# Patient Record
Sex: MALE | Race: WHITE
[De-identification: names, ages, dates, MRNs, and addresses within clinical notes are randomized per-mention and may not be internally consistent; named-entity substitution may affect disease eponyms.]

---

## 2020-02-22 ENCOUNTER — HOSPITAL ENCOUNTER (EMERGENCY)
Dept: HOSPITAL 77 - KA.ED | Age: 60
Discharge: HOME | End: 2020-02-22
Payer: COMMERCIAL

## 2020-02-22 DIAGNOSIS — J10.1: Primary | ICD-10-CM

## 2020-02-22 DIAGNOSIS — E11.65: ICD-10-CM

## 2020-02-22 LAB
ANION GAP SERPL CALC-SCNC: 21.8 MMOL/L (ref 5–15)
CHLORIDE SERPL-SCNC: 103 MMOL/L (ref 98–115)
SODIUM SERPL-SCNC: 143 MMOL/L (ref 136–145)

## 2020-02-22 NOTE — EDM.PDOC
ED HPI GENERAL MEDICAL PROBLEM





- General


Chief Complaint: General


Stated Complaint: COUGH,BODY ACHES,FEVER


Time Seen by Provider: 02/22/20 13:22


Source of Information: Reports: Patient


History Limitations: Reports: No Limitations





- History of Present Illness


INITIAL COMMENTS - FREE TEXT/NARRATIVE: 





Thursday developed fever with mild cough. This is progressed with body aches 

increased fever and worsening cough.





Beyond 48 hour Tamiflu window at this time.





Blood sugars have been averaging in 120s only does not test daily.


Onset: Gradual


Onset Date: 02/20/20


Onset Time: 12:00


Duration: Day(s):, Getting Worse


Location: Reports: Head, Chest


Quality: Reports: Burning


Severity: Moderate


Improves with: Reports: None


Worsens with: Reports: Breathing


Associated Symptoms: Reports: Cough, Fever/Chills


Treatments PTA: Reports: Acetaminophen





- Related Data


 Allergies











Allergy/AdvReac Type Severity Reaction Status Date / Time


 


No Known Allergies Allergy   Verified 02/22/20 13:08











Home Meds: 


 Home Meds





lisinopriL [Lisinopril] 40 mg PO DAILY 02/22/20 [History]











Past Medical History


Cardiovascular History: Reports: None, High Cholesterol


Respiratory History: Reports: None


Psychiatric History: Reports: None


Endocrine/Metabolic History: Reports: Diabetes, Type II





Social & Family History





- Family History


Family Medical History: Noncontributory





ED ROS GENERAL





- Review of Systems


Review Of Systems: See Below


Constitutional: Reports: Fever, Chills, Malaise


HEENT: Reports: No Symptoms


Respiratory: Reports: Cough


Cardiovascular: Reports: No Symptoms.  Denies: Chest Pain


Endocrine: Reports: High Glucose


GI/Abdominal: Reports: No Symptoms


: Reports: No Symptoms


Musculoskeletal: Reports: No Symptoms, Muscle Pain, Muscle Stiffness


Skin: Reports: No Symptoms


Neurological: Reports: No Symptoms


Psychiatric: Reports: No Symptoms


Hematologic/Lymphatic: Reports: No Symptoms


Immunologic: Reports: No Symptoms





ED EXAM, GENERAL





- Physical Exam


Exam: See Below


Exam Limited By: No Limitations


General Appearance: Alert, WD/WN, No Apparent Distress


Ears: Normal External Exam, Normal Canal, Hearing Grossly Normal, Normal TMs


Nose: Normal Inspection, Normal Mucosa, No Blood


Throat/Mouth: Normal Inspection, Normal Lips, Normal Teeth, Normal Gums, Normal 

Oropharynx, Normal Voice, No Airway Compromise


Head: Atraumatic, Normocephalic


Neck: Normal Inspection, Supple, Non-Tender, Full Range of Motion


Respiratory/Chest: No Respiratory Distress, Lungs Clear, Normal Breath Sounds, 

No Accessory Muscle Use, Chest Non-Tender


Cardiovascular: Normal Peripheral Pulses, Regular Rate, Rhythm, No Edema, No 

Gallop, No JVD, No Murmur, No Rub


GI/Abdominal: Normal Bowel Sounds, Soft, Non-Tender, No Organomegaly, No 

Distention, No Abnormal Bruit, No Mass


 (Male) Exam: Deferred


Rectal (Males) Exam: Deferred


Back Exam: Normal Inspection


Extremities: Normal Inspection, Normal Range of Motion


Neurological: Alert, Oriented, CN II-XII Intact, Normal Cognition, Normal Gait, 

Normal Reflexes, No Motor/Sensory Deficits


Psychiatric: Normal Affect, Normal Mood


Skin Exam: Warm, Dry, Intact, Normal Color, No Rash





Course





- Vital Signs


Last Recorded V/S: 


 Last Vital Signs











Temp  36.8 C   02/22/20 13:03


 


Pulse  105 H  02/22/20 13:03


 


Resp  16   02/22/20 13:03


 


BP  152/83 H  02/22/20 13:03


 


Pulse Ox  93 L  02/22/20 13:03














- Orders/Labs/Meds


Labs: 


 Laboratory Tests











  02/22/20 02/22/20 Range/Units





  13:25 13:25 


 


WBC  6.25   (5.00-10.00)  10^3/uL


 


RBC  4.98   (4.50-6.00)  10^6/uL


 


Hgb  15.0   (13.0-17.0)  g/dL


 


Hct  43.4   (40.0-52.0)  %


 


MCV  87.1   (82.0-92.0)  fL


 


MCH  30.1   (27.0-31.0)  pg


 


MCHC  34.6   (32.0-36.0)  g/dL


 


RDW  13.0   (11.5-14.5)  %


 


Plt Count  111 L   (150-400)  10^3/uL


 


MPV  10.5 H   (7.4-10.4)  fL


 


Immature Gran % (Auto)  0.0   (0.0-5.0)  %


 


Neut % (Auto)  81.7 H   (50.0-70.0)  %


 


Lymph % (Auto)  8.3 L   (20.0-40.0)  %


 


Mono % (Auto)  9.6 H   (2.0-8.0)  %


 


Eos % (Auto)  0.2 L   (1.0-3.0)  %


 


Baso % (Auto)  0.2   (0.0-1.0)  %


 


Immature Gran # (Auto)  0.00   (0.00-0.50)  10^3/uL


 


Neut # (Auto)  5.11   (2.50-7.00)  10^3/uL


 


Lymph # (Auto)  0.52 L   (1.00-4.00)  10^3/uL


 


Mono # (Auto)  0.60   (0.10-0.80)  10^3/uL


 


Eos # (Auto)  0.01 L   (0.10-0.30)  10^3/uL


 


Baso # (Auto)  0.01   (0.00-0.10)  10^3/uL


 


Sodium   143  (136-145)  mmol/L


 


Potassium   3.7  (3.3-5.3)  mmol/L


 


Chloride   103  ()  mmol/L


 


Carbon Dioxide   21.9  (21.0-32.0)  mmol/L


 


Anion Gap   21.8 H  (5-15)  mmol/L


 


BUN   13  (6-25)  mg/dL


 


Creatinine   0.97  (0.51-1.17)  mg/dL


 


Est Cr Clr Drug Dosing   95.34  mL/min


 


Estimated GFR (MDRD)   > 60  mL/min


 


Glucose   233 H (75 - 99) mg/dL


 


Calcium   8.3 L  (8.7-10.3)  mg/dL














Departure





- Departure


Time of Disposition: 14:12


Disposition: Home, Self-Care 01


Condition: Good


Clinical Impression: 


 Influenza B, Elevated glucose level








- Discharge Information


*PRESCRIPTION DRUG MONITORING PROGRAM REVIEWED*: Not Applicable


*COPY OF PRESCRIPTION DRUG MONITORING REPORT IN PATIENT CELESTE: Not Applicable


Instructions:  Cough, Adult


Referrals: 


Deena Meza MD [Primary Care Provider] - 


Forms:  ED Department Discharge


Additional Instructions: 


Increase your fluid intake.


Tylenol or ibuprofen for fever, chills, and body aches.


Continue your medications as directed


He need to get as much rest as possible.





You are considered contagious for 24 hours after you have had no fever without 

the use of Tylenol or Motrin.





Need to recheck if symptoms worsen or if worsening shortness of breath cough 

develops.





Use sugarless cough drops.





Sepsis Event Note





- Evaluation


Sepsis Screening Result: Possible Sepsis Risk





- Focused Exam


Vital Signs: 


 Vital Signs











  Temp Pulse Resp BP Pulse Ox


 


 02/22/20 13:03  36.8 C  105 H  16  152/83 H  93 L











Date Exam was Performed: 02/22/20


Time Exam was Performed: 14:11





- Problem List & Annotations


(1) Influenza B


SNOMED Code(s): 02919577


   Code(s): J10.1 - FLU DUE TO OTH IDENT INFLUENZA VIRUS W OTH RESP MANIFEST   

Status: Acute   Priority: High   Current Visit: Yes   





(2) Elevated glucose level


SNOMED Code(s): 03534505


   Code(s): R73.09 - OTHER ABNORMAL GLUCOSE   Status: Chronic   Priority: 

Medium   Current Visit: Yes   





- Problem List Review


Problem List Initiated/Reviewed/Updated: Yes





- Assessment/Plan


Plan: 





Increase your fluid intake.


Tylenol or ibuprofen for fever, chills, and body aches.


Continue your medications as directed


He need to get as much rest as possible.





You are considered contagious for 24 hours after you have had no fever without 

the use of Tylenol or Motrin.





Need to recheck if symptoms worsen or if worsening shortness of breath cough 

develops.





Use sugarless cough drops.